# Patient Record
Sex: MALE | Race: WHITE | Employment: FULL TIME | URBAN - METROPOLITAN AREA
[De-identification: names, ages, dates, MRNs, and addresses within clinical notes are randomized per-mention and may not be internally consistent; named-entity substitution may affect disease eponyms.]

---

## 2020-05-27 ENCOUNTER — HOSPITAL ENCOUNTER (EMERGENCY)
Age: 25
Discharge: HOME | End: 2020-05-27
Payer: COMMERCIAL

## 2020-05-27 ENCOUNTER — APPOINTMENT (OUTPATIENT)
Dept: GENERAL RADIOLOGY | Age: 25
End: 2020-05-27
Payer: COMMERCIAL

## 2020-05-27 VITALS
SYSTOLIC BLOOD PRESSURE: 116 MMHG | TEMPERATURE: 97.4 F | BODY MASS INDEX: 30.06 KG/M2 | OXYGEN SATURATION: 99 % | HEIGHT: 70 IN | RESPIRATION RATE: 16 BRPM | WEIGHT: 210 LBS | DIASTOLIC BLOOD PRESSURE: 87 MMHG | HEART RATE: 71 BPM

## 2020-05-27 PROCEDURE — 2500000003 HC RX 250 WO HCPCS: Performed by: PHYSICIAN ASSISTANT

## 2020-05-27 PROCEDURE — 99283 EMERGENCY DEPT VISIT LOW MDM: CPT

## 2020-05-27 PROCEDURE — 6360000002 HC RX W HCPCS: Performed by: PHYSICIAN ASSISTANT

## 2020-05-27 PROCEDURE — 96372 THER/PROPH/DIAG INJ SC/IM: CPT

## 2020-05-27 PROCEDURE — 73130 X-RAY EXAM OF HAND: CPT

## 2020-05-27 PROCEDURE — 12001 RPR S/N/AX/GEN/TRNK 2.5CM/<: CPT

## 2020-05-27 PROCEDURE — 90715 TDAP VACCINE 7 YRS/> IM: CPT | Performed by: PHYSICIAN ASSISTANT

## 2020-05-27 PROCEDURE — 6370000000 HC RX 637 (ALT 250 FOR IP): Performed by: PHYSICIAN ASSISTANT

## 2020-05-27 PROCEDURE — 90471 IMMUNIZATION ADMIN: CPT | Performed by: PHYSICIAN ASSISTANT

## 2020-05-27 RX ORDER — OXYCODONE HYDROCHLORIDE AND ACETAMINOPHEN 5; 325 MG/1; MG/1
1 TABLET ORAL ONCE
Status: COMPLETED | OUTPATIENT
Start: 2020-05-27 | End: 2020-05-27

## 2020-05-27 RX ORDER — CEPHALEXIN 500 MG/1
500 CAPSULE ORAL 4 TIMES DAILY
Qty: 28 CAPSULE | Refills: 0 | Status: SHIPPED | OUTPATIENT
Start: 2020-05-27 | End: 2020-06-03

## 2020-05-27 RX ORDER — LIDOCAINE HYDROCHLORIDE 10 MG/ML
20 INJECTION, SOLUTION INFILTRATION; PERINEURAL ONCE
Status: COMPLETED | OUTPATIENT
Start: 2020-05-27 | End: 2020-05-27

## 2020-05-27 RX ORDER — OXYCODONE HYDROCHLORIDE AND ACETAMINOPHEN 5; 325 MG/1; MG/1
1 TABLET ORAL EVERY 6 HOURS PRN
Qty: 20 TABLET | Refills: 0 | Status: SHIPPED | OUTPATIENT
Start: 2020-05-27 | End: 2020-06-01

## 2020-05-27 RX ORDER — CEFAZOLIN SODIUM 1 G/3ML
1 INJECTION, POWDER, FOR SOLUTION INTRAMUSCULAR; INTRAVENOUS ONCE
Status: COMPLETED | OUTPATIENT
Start: 2020-05-27 | End: 2020-05-27

## 2020-05-27 RX ADMIN — CEFAZOLIN 1 G: 1 INJECTION, POWDER, FOR SOLUTION INTRAMUSCULAR; INTRAVENOUS at 18:41

## 2020-05-27 RX ADMIN — TETANUS TOXOID, REDUCED DIPHTHERIA TOXOID AND ACELLULAR PERTUSSIS VACCINE, ADSORBED 0.5 ML: 5; 2.5; 8; 8; 2.5 SUSPENSION INTRAMUSCULAR at 14:48

## 2020-05-27 RX ADMIN — LIDOCAINE HYDROCHLORIDE 20 ML: 10 INJECTION, SOLUTION INFILTRATION; PERINEURAL at 14:48

## 2020-05-27 RX ADMIN — OXYCODONE HYDROCHLORIDE AND ACETAMINOPHEN 1 TABLET: 5; 325 TABLET ORAL at 14:47

## 2020-05-27 ASSESSMENT — PAIN DESCRIPTION - LOCATION: LOCATION: FINGER (COMMENT WHICH ONE)

## 2020-05-27 ASSESSMENT — PAIN SCALES - GENERAL
PAINLEVEL_OUTOF10: 7
PAINLEVEL_OUTOF10: 7

## 2020-05-27 ASSESSMENT — PAIN DESCRIPTION - DESCRIPTORS: DESCRIPTORS: THROBBING

## 2020-05-27 ASSESSMENT — PAIN DESCRIPTION - PAIN TYPE: TYPE: ACUTE PAIN

## 2020-05-27 ASSESSMENT — PAIN DESCRIPTION - ONSET: ONSET: ON-GOING

## 2020-05-27 ASSESSMENT — PAIN DESCRIPTION - ORIENTATION: ORIENTATION: LEFT

## 2020-05-27 ASSESSMENT — PAIN DESCRIPTION - FREQUENCY: FREQUENCY: CONTINUOUS

## 2020-05-28 NOTE — CONSULTS
incontinence  HEMATOLOGIC/LYMPHATIC:  negative for bleeding and petechiae  MUSCULOSKELETAL:  positive for left index and middle finger pain NEUROLOGICAL:  negative for headaches, dizziness  BEHAVIOR/PSYCH:  negative for increased agitation and anxiety    PHYSICAL EXAM:    VITALS:  /87   Pulse 71   Temp 97.4 °F (36.3 °C) (Temporal)   Resp 16   Ht 5' 10\" (1.778 m)   Wt 210 lb (95.3 kg)   SpO2 99%   BMI 30.13 kg/m²   CONSTITUTIONAL:  awake, alert, cooperative, no apparent distress, and appears stated age  MUSCULOSKELETAL:  Left upper Extremity:  2 cm laceration overlying the proximal aspect of the left index finger nail plate. No active bleeding noted from laceration site. Unable to probe to bone however underlying nailbed appears disrupted. No appreciable lacerations over the middle finger or other fingers of the left hand  Ecchymosis and edema overlying the distal phalanx of the left index and middle fingers  Tenderness to palpation overlying left distal index and middle fingers  Patient with active range of motion at all MCP and IP joints in flexion and extension  +AIN/PIN/Ulnar nerve function intact grossly  +Radial pulse, Brisk Cap refill, hand warm and perfused  Sensation intact to touch in radial/ulnar/median nerve distributions to hand      DATA:    CBC: No results found for: WBC, RBC, HGB, HCT, MCV, MCH, MCHC, RDW, PLT, MPV  PT/INR:  No results found for: PROTIME, INR    Radiology Review:  X-ray left hand: Nondisplaced, oblique tuft fractures of the index and middle finger distal phalanxes. No other fractures or dislocations noted. IMPRESSION:  · Left index finger tuft fracture with associated nailbed injury  · Left middle finger tuft fracture, closed    PLAN:  · Bedside nail plate removal, nailbed repair, and I&D of left index finger were performed. Patient verbally consented to aforementioned procedure.   He was then anesthetized utilizing 10 cc 1% lidocaine without epi in the dorsal and

## 2020-06-03 ENCOUNTER — TELEPHONE (OUTPATIENT)
Dept: ADMINISTRATIVE | Age: 25
End: 2020-06-03

## 2020-06-03 NOTE — TELEPHONE ENCOUNTER
Pt seen in ER 5/27 by Ortho res. Nailbed repair and I&D left index finger preformed in ER. 7 days of Keflex prescribed.     XR L hand:  Impression       Tuft avulsion of the distal phalanges of the second and third fingers.

## 2020-06-11 ENCOUNTER — HOSPITAL ENCOUNTER (OUTPATIENT)
Dept: GENERAL RADIOLOGY | Age: 25
Discharge: HOME | End: 2020-06-13
Payer: COMMERCIAL

## 2020-06-11 ENCOUNTER — OFFICE VISIT (OUTPATIENT)
Dept: ORTHOPEDIC SURGERY | Age: 25
End: 2020-06-11
Payer: COMMERCIAL

## 2020-06-11 VITALS
WEIGHT: 210 LBS | DIASTOLIC BLOOD PRESSURE: 80 MMHG | HEART RATE: 79 BPM | HEIGHT: 70 IN | SYSTOLIC BLOOD PRESSURE: 127 MMHG | BODY MASS INDEX: 30.06 KG/M2

## 2020-06-11 PROBLEM — S62.609B OPEN FRACTURE OF ONE OR MORE PHALANGES OF HAND: Status: ACTIVE | Noted: 2020-06-11

## 2020-06-11 PROCEDURE — 99212 OFFICE O/P EST SF 10 MIN: CPT

## 2020-06-11 PROCEDURE — 73130 X-RAY EXAM OF HAND: CPT

## 2020-06-11 PROCEDURE — 99203 OFFICE O/P NEW LOW 30 MIN: CPT | Performed by: ORTHOPAEDIC SURGERY

## 2020-06-11 NOTE — PROGRESS NOTES
Orthopaedic New Patient Note        Hillary Lindsey is a 30 y.o. male, his YOB: 1995 with the following history as recorded in [employer]: There are no active problems to display for this patient. No current outpatient medications on file. No current facility-administered medications for this visit. Allergies: Penicillins  History reviewed. No pertinent past medical history. Past Surgical History:   Procedure Laterality Date    CONTROL OF NOSE BLEED LIGATION & NASAL ENDOSCOPY      WISDOM TOOTH EXTRACTION       History reviewed. No pertinent family history. Social History     Tobacco Use    Smoking status: Never Smoker    Smokeless tobacco: Never Used   Substance Use Topics    Alcohol use: No                           Review of Systems   Constitutional: Negative for fever and chills. HENT: Negative for ear pain, sore throat and sinus pressure. Eyes: Negative for pain, discharge and redness. Respiratory: Negative for cough, shortness of breath and wheezing. Cardiovascular: Negative for chest pain. Gastrointestinal: Negative for nausea, vomiting, diarrhea and abdominal distention. Genitourinary: Negative for dysuria and frequency. Musculoskeletal: Negative for back pain and arthralgias. All other systems reviewed and negative. CC: Left hand injury    S: Hillary Lindsey is a 30 y.o.  right hand dominant male who presents for follow-up for his left hand. Initial date of injury was 5/27/2020. This was a workers comp injury that occurred to crushing the index and middle finger and the large pieces still at his workplace. He was seen in the ER date of injury required a nailbed repair to the index finger. He has been using AlumaFoam splints to the index and middle fingers. He did finish his antibiotics. Pain is tolerable at this point. Has no other interval questions or concerns. Has been keeping his fingers clean and covered.       Physical Exam  /80 (Site: Right Upper Arm, Position: Sitting, Cuff Size: Medium Adult)   Pulse 79   Ht 5' 10\" (1.778 m)   Wt 210 lb (95.3 kg)   BMI 30.13 kg/m²   O:   CONSTITUTIONAL: awake, alert, cooperative, no apparent distress, and appears stated age  EYES: Lids and lashes normal, pupils equal, round and reactive to light, extra ocular muscles intact, sclera clear, conjunctiva normal  ENT: Normocephalic, without obvious abnormality, atraumatic, external ears without lesions, oral pharynx with moist mucus membranes  NECK: Trachea midline, skin normal  LUNGS: No increased work of breathing, good air exchange  CARDIOVASCULAR: 2+ pulses throughout and capillary Refill less than 2 seconds  ABDOMEN: soft, non-distended, non-tender and no rebound tenderness or guarding  NEUROLOGIC: Awake, alert, oriented to name, place and time. Cranial nerves II-XII are grossly intact. Motor is 5 out of 5 bilaterally. Sensory is intact.    Left Hand Exam:  Obvious nailbed injury which had previous repair to the index finger along with subungual ecchymosis very small region to the middle finger  There is no obvious deformity to the tips of the digits  There is really no swelling about the involved digits, no erythema  Involved fingers are warm and perfused with brisk cap refill distally  There is sensation to gross touch although abnormal to the index finger  The previous extruded nail is within the folds and intact with 3 peripheral sutures  Demonstrates good active ROM through the IP joints of the middle finger, there is limited active ROM through the PIP and DIP of the index finger    Xrays Reviewed  Left hand-fractures through distal extent of distal phalanx of index and middle fingers, these are relatively unchanged and acceptably aligned    ASSESSMENT:  Left index and middle finger crush injury  Nailbed repair left index finger    PLAN:   Sutures removed today to the index finger  Discussed with patient he may perform standard hygiene care  He is to

## 2020-07-02 ENCOUNTER — HOSPITAL ENCOUNTER (OUTPATIENT)
Dept: GENERAL RADIOLOGY | Age: 25
Discharge: HOME | End: 2020-07-04
Payer: COMMERCIAL

## 2020-07-02 ENCOUNTER — OFFICE VISIT (OUTPATIENT)
Dept: ORTHOPEDIC SURGERY | Age: 25
End: 2020-07-02
Payer: COMMERCIAL

## 2020-07-02 VITALS — HEART RATE: 80 BPM | DIASTOLIC BLOOD PRESSURE: 78 MMHG | SYSTOLIC BLOOD PRESSURE: 132 MMHG

## 2020-07-02 PROBLEM — S67.22XA CRUSHING INJURY OF LEFT HAND: Status: ACTIVE | Noted: 2020-07-02

## 2020-07-02 PROCEDURE — 99213 OFFICE O/P EST LOW 20 MIN: CPT | Performed by: ORTHOPAEDIC SURGERY

## 2020-07-02 PROCEDURE — 99212 OFFICE O/P EST SF 10 MIN: CPT

## 2020-07-02 PROCEDURE — 73130 X-RAY EXAM OF HAND: CPT

## 2020-07-02 NOTE — PROGRESS NOTES
and covered. He is back at work and uses his splint when at work. Has been very compliant with work on his range of motion and almost able to make a full composite fist at this time. Physical Exam  /78   Pulse 80   O:   CONSTITUTIONAL: awake, alert, cooperative, no apparent distress, and appears stated age  EYES: Lids and lashes normal, pupils equal, round and reactive to light, extra ocular muscles intact, sclera clear, conjunctiva normal  ENT: Normocephalic, without obvious abnormality, atraumatic, external ears without lesions, oral pharynx with moist mucus membranes  NECK: Trachea midline, skin normal  LUNGS: No increased work of breathing, good air exchange  CARDIOVASCULAR: 2+ pulses throughout and capillary Refill less than 2 seconds  ABDOMEN: soft, non-distended, non-tender and no rebound tenderness or guarding  NEUROLOGIC: Awake, alert, oriented to name, place and time. Cranial nerves II-XII are grossly intact. Motor is 5 out of 5 bilaterally. Sensory is intact.    Left Hand Exam:  Swelling is resolving, detached nail still present to the index finger with new nail under growing  Middle finger also healing well, no signs of infection  There is really no swelling about the involved digits, no erythema  Involved fingers are warm and perfused with brisk cap refill distally  There is sensation to gross touch although abnormal to the index finger  Demonstrates good active ROM through the IP joints of the middle finger, ROM to the index finger has significantly improved, tip to palm less than a centimeter at this point    Xrays Reviewed  Left hand-fractures through distal extent of distal phalanx of index and middle fingers, these are relatively unchanged and acceptably aligned    ASSESSMENT:  Left index and middle finger crush injury  Nailbed repair left index finger    PLAN:   Again offered OT which patient declined, has been doing a good job himself and will continue to do HEP  Again discussed

## 2020-08-13 ENCOUNTER — OFFICE VISIT (OUTPATIENT)
Dept: ORTHOPEDIC SURGERY | Age: 25
End: 2020-08-13
Payer: COMMERCIAL

## 2020-08-13 ENCOUNTER — HOSPITAL ENCOUNTER (OUTPATIENT)
Dept: GENERAL RADIOLOGY | Age: 25
Discharge: HOME | End: 2020-08-15
Payer: COMMERCIAL

## 2020-08-13 VITALS
BODY MASS INDEX: 30.13 KG/M2 | SYSTOLIC BLOOD PRESSURE: 137 MMHG | HEART RATE: 72 BPM | HEIGHT: 70 IN | DIASTOLIC BLOOD PRESSURE: 80 MMHG

## 2020-08-13 PROCEDURE — 99212 OFFICE O/P EST SF 10 MIN: CPT | Performed by: PHYSICIAN ASSISTANT

## 2020-08-13 PROCEDURE — 73130 X-RAY EXAM OF HAND: CPT

## 2020-08-13 NOTE — LETTER
[address]  [address]  [address] [facility] [address]  Phone: [phone]  Fax: [phone]    Cortez Love PA-C        August 13, 2020     Patient: Alfonso Cortez   YOB: 1995   Date of Visit: 8/13/2020       To Whom It May Concern: It is my medical opinion that Alfonso Cortez may return to full duty immediately with no restrictions. If you have any questions or concerns, please don't hesitate to call.     Sincerely,        Cortez Love PA-C

## 2020-08-17 NOTE — PROGRESS NOTES
Orthopaedic Office Note        Carl Keys is a 30 y.o. male, his YOB: 1995 with the following history as recorded in [facility]:    Patient Active Problem List    Diagnosis Date Noted    Crushing injury of left hand 07/02/2020    Open fracture of one or more phalanges of hand 06/11/2020     No current outpatient medications on file. No current facility-administered medications for this visit. Allergies: Penicillins  No past medical history on file. Past Surgical History:   Procedure Laterality Date    CONTROL OF NOSE BLEED LIGATION & NASAL ENDOSCOPY      WISDOM TOOTH EXTRACTION       No family history on file. Social History     Tobacco Use    Smoking status: Never Smoker    Smokeless tobacco: Never Used   Substance Use Topics    Alcohol use: No                           Review of Systems   Constitutional: Negative for fever and chills. HENT: Negative for ear pain, sore throat and sinus pressure. Eyes: Negative for pain, discharge and redness. Respiratory: Negative for cough, shortness of breath and wheezing. Cardiovascular: Negative for chest pain. Gastrointestinal: Negative for nausea, vomiting, diarrhea and abdominal distention. Genitourinary: Negative for dysuria and frequency. Musculoskeletal: Negative for back pain and arthralgias. All other systems reviewed and negative. CC: Left hand injury    S: Carl Keys is a 30 y.o.  right hand dominant male who presents for follow-up for his left hand. Initial date of injury was 5/27/2020. Now 11 weeks from initial injury This was a workers comp injury that occurred to crushing the index and middle finger and the large pieces still at his workplace. He was seen in the ER date of injury required a nailbed repair to the index finger. Patient has been able to wean off of alumafoam splints. Denies pain or numbness or tingling to the digits. Feels that his ROM has improved significacntly.   Has no other interval questions or concerns. Has been keeping his fingers clean and covered. He is back at work. Has been very compliant with work on his range of motion and almost able to make a full composite fist at this time. Physical Exam  /80   Pulse 72   Ht 5' 10\" (1.778 m)   BMI 30.13 kg/m²   O:   Alert and oriented X 3, no acute distress, normocephalic, atraumatic, external eyes and ears normal. Abdomen not distended. Respirations easy and unlabored with no audible wheezes, skin warm and dry, speech and dress appropriate for noted age, affect euthymic.     Left Hand Exam:  Swelling is resolved,  index finger with new nail under growing without signs if infection or ingrown nail  Middle finger also healing well, no signs of infection  There is really no swelling about the involved digits, no erythema  Involved fingers are warm and perfused with brisk cap refill distally  There is sensation to gross touch although abnormal to the index finger  Demonstrates good active ROM through the IP joints of the middle finger, ROM to the index finger has significantly improved, patient able to make full concentric fist without pain  No TTP to the tips of the index or middle finger on exam    Xrays Reviewed  Left hand-fractures through distal extent of distal phalanx of index and middle fingers, these are relatively unchanged and acceptably aligned    ASSESSMENT:  Left index and middle finger crush injury  Nailbed repair left index finger    PLAN:   Patient has regained full ROM  Has no pain with exam today  Left index finger nail regrowing appropriately-- instructed on s/s to monitor for ingrown nail or signs of infection  Patient has returned to all activities as normal  Patient has weaned self out of splint for work  Patient may follow up with orthopedics on an as needed basis    Electronically signed by Cecille Ravi PA-C on 8/17/2020 at 11:08 AM  Note: This report was completed using computermanetch voiced recognition software.  Every effort has been made to ensure accuracy; however, inadvertent computerized transcription errors may be present.